# Patient Record
Sex: MALE | Race: WHITE | NOT HISPANIC OR LATINO | Employment: PART TIME | URBAN - METROPOLITAN AREA
[De-identification: names, ages, dates, MRNs, and addresses within clinical notes are randomized per-mention and may not be internally consistent; named-entity substitution may affect disease eponyms.]

---

## 2017-01-24 ENCOUNTER — HOSPITAL ENCOUNTER (OUTPATIENT)
Dept: SLEEP MEDICINE | Facility: HOSPITAL | Age: 57
Discharge: HOME | End: 2017-01-24
Attending: INTERNAL MEDICINE | Admitting: INTERNAL MEDICINE

## 2017-01-24 ENCOUNTER — DOCUMENTATION (OUTPATIENT)
Dept: INTERNAL MEDICINE | Facility: HOSPITAL | Age: 57
End: 2017-01-24

## 2017-01-24 PROCEDURE — G0463 HOSPITAL OUTPT CLINIC VISIT: HCPCS

## 2017-01-24 NOTE — PROGRESS NOTES
PULMONARY SLEEP CONSULT NOTE      PATIENT IDENTIFICATION:  Name: Gerard Louis  Age: 56 y.o.  Sex: male  :  1960  MRN: [id number]    DATE OF CONSULTATION:  2017   Referring Physician: No admitting provider for patient encounter.                  CHIEF COMPLAINT: Obstructive Sleep Apnea      History of Present Illness:   Gerard Louis is a 56 y.o. male Pt on CPAP feeling better more energy especially the night he use it more than 4 hours, no sleepiness no fatigue no tiredness, no mask irritation no dryness, compliance report reviewed with pt AHI< 5 with good usage.       Review of Systems:   Constitutional: negative   Eyes: negative   ENT/oropharynx: negative   Cardiovascular: negative   Respiratory: negative   Gastrointestinal: negative   Genitourinary: negative   Neurological: negative   Musculoskeletal: negative   Integument/breast: negative   Endocrine: negative   Allergic/Immunologic: negative     Past Medical History:  No past medical history on file.    Past Surgical History:  No past surgical history on file.     Family History:  No family history on file.     Social History:   Social History   Substance Use Topics   • Smoking status: Not on file   • Smokeless tobacco: Not on file   • Alcohol use Not on file        Allergies:  Allergies not on file    Home Meds:    (Not in a hospital admission)    Objective:    Vitals Ranges:      WEIGHT: 214   pound       HEIGHT: 65  inches     BMI: 36   /83      Exam:    General Appearance:      Head:  Normocephalic, without obvious abnormality, atraumatic.   Eyes:  Conjunctiva/corneas clear, EOM's intact, both eyes.         Ears:  Normal external ear canals, both ears.    Nose:  Nares normal, no drainage      Throat:  Lips, mucosa, and tongue normal. Mallampati score 3    Neck:  Supple, symmetrical, trachea midline. No JVD.  Lungs:   Bilateral basal rhonchi bilaterally, respirations unlabored symmetrical wall movement.    Chest wall:  No tenderness or  deformity.    Heart:  Regular rate and rhythm, S1 and S2 normal.  Abdomen: Soft, non-tender, no masses, no organomegaly.    Extremities: Trace edema no clubbing or Cyanosis        Data Review:  All labs (24hrs): No results found for this or any previous visit (from the past 24 hour(s)).     Imaging:  [unfilled]    ASSESSMENT:  obstructive sleep apnea   .Hypertension essential   COPD    PLAN:   This patient with obstructive sleep apnea, compliance is improved. Encourage to use it more frequent, I re-emphasized on pt the long and short term benefit of treating DELANO.   Treating DELANO will improve BP control  Quit smoking continue BD  Teaching and encourging for smoking cessation        Lisa Devine MD. D, ABSM.  1/24/2017  11:09 AM

## 2017-12-12 ENCOUNTER — TRANSCRIBE ORDERS (OUTPATIENT)
Dept: ADMINISTRATIVE | Facility: HOSPITAL | Age: 57
End: 2017-12-12

## 2017-12-12 DIAGNOSIS — R31.0 GROSS HEMATURIA: Primary | ICD-10-CM

## 2017-12-18 ENCOUNTER — HOSPITAL ENCOUNTER (OUTPATIENT)
Dept: CT IMAGING | Facility: HOSPITAL | Age: 57
Discharge: HOME | End: 2017-12-18
Attending: UROLOGY | Admitting: UROLOGY

## 2017-12-18 DIAGNOSIS — R31.0 GROSS HEMATURIA: ICD-10-CM

## 2017-12-18 PROCEDURE — 0 IOPAMIDOL PER 1 ML: Performed by: UROLOGY

## 2017-12-18 PROCEDURE — 74178 CT ABD&PLV WO CNTR FLWD CNTR: CPT

## 2017-12-18 RX ADMIN — IOPAMIDOL 100 ML: 755 INJECTION, SOLUTION INTRAVENOUS at 13:15

## 2019-03-02 ENCOUNTER — APPOINTMENT (OUTPATIENT)
Dept: CT IMAGING | Facility: HOSPITAL | Age: 59
End: 2019-03-02

## 2019-03-02 ENCOUNTER — HOSPITAL ENCOUNTER (EMERGENCY)
Facility: HOSPITAL | Age: 59
Discharge: HOME | End: 2019-03-02
Attending: EMERGENCY MEDICINE | Admitting: EMERGENCY MEDICINE

## 2019-03-02 VITALS
TEMPERATURE: 98.7 F | WEIGHT: 222 LBS | BODY MASS INDEX: 31.08 KG/M2 | RESPIRATION RATE: 12 BRPM | HEART RATE: 72 BPM | HEIGHT: 71 IN | SYSTOLIC BLOOD PRESSURE: 147 MMHG | OXYGEN SATURATION: 98 % | DIASTOLIC BLOOD PRESSURE: 97 MMHG

## 2019-03-02 DIAGNOSIS — H93.19 TINNITUS, UNSPECIFIED LATERALITY: Primary | ICD-10-CM

## 2019-03-02 PROCEDURE — 99282 EMERGENCY DEPT VISIT SF MDM: CPT | Performed by: EMERGENCY MEDICINE

## 2019-03-02 PROCEDURE — 99283 EMERGENCY DEPT VISIT LOW MDM: CPT

## 2019-03-02 PROCEDURE — 70450 CT HEAD/BRAIN W/O DYE: CPT

## 2019-03-02 RX ORDER — ASPIRIN 81 MG/1
81 TABLET ORAL DAILY
COMMUNITY

## 2019-03-02 RX ORDER — LEVOTHYROXINE SODIUM 0.07 MG/1
75 TABLET ORAL DAILY
COMMUNITY

## 2019-03-02 RX ORDER — LORAZEPAM 1 MG/1
1 TABLET ORAL EVERY 8 HOURS PRN
Qty: 24 TABLET | Refills: 0 | Status: SHIPPED | OUTPATIENT
Start: 2019-03-02

## 2019-03-02 RX ORDER — NIACIN 500 MG
500 TABLET ORAL NIGHTLY
COMMUNITY

## 2019-03-02 RX ORDER — LANOLIN ALCOHOL/MO/W.PET/CERES
5000 CREAM (GRAM) TOPICAL DAILY
COMMUNITY

## 2019-03-02 RX ORDER — LISINOPRIL 10 MG/1
20 TABLET ORAL DAILY
COMMUNITY

## 2019-03-02 RX ORDER — MULTIPLE VITAMINS W/ MINERALS TAB 9MG-400MCG
1 TAB ORAL DAILY
COMMUNITY

## 2019-03-02 NOTE — ED PROVIDER NOTES
"Subjective     History provided by:  Patient and spouse    History of Present Illness    · Chief complaint: Ringing and buzzing in his ears and head    · Location: Both ears and in his head    · Quality/Severity: The patient reports that he has a ringing and buzzing noise in his ears in his head that is gotten worse interfering with his daily activity and sleeping.    · Timing/Onset: He states that he has had the ringing and buzzing noise in his ears for over a year, but that it got acutely worse over the last 4-5 days.    · Modifying Factors: He is unaware of any aggravating or relieving factors.    · Associated symptoms: None.  Denies any change in vision or change in hearing.    · Narrative: The patient is a 58-year-old white male who has had a chronic ringing and buzzing noise in his ears for over a year.  He states the noise in his ears now feels like it is in his head and is significantly louder over the past 4-5 days.  He was evaluated at the [facility] in [place] yesterday and simply told that he needed \"$11,000 hearing aids\".  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  He states that he often did not wear hearing protection.  He denies any giovanna headache or neck pain.  He denies any reduction in his baseline hearing.    Review of Systems   Constitutional: Negative for activity change, appetite change, chills, diaphoresis, fatigue and fever.   HENT: Positive for tinnitus. Negative for congestion, dental problem, ear discharge, ear pain, hearing loss, mouth sores, nosebleeds, postnasal drip, rhinorrhea, sinus pressure, sinus pain, sore throat, trouble swallowing and voice change.    Eyes: Negative for photophobia, pain, discharge, redness and visual disturbance.   Respiratory: Negative for cough, chest tightness, shortness of breath, wheezing and stridor.    Cardiovascular: Negative " "for chest pain, palpitations and leg swelling.   Gastrointestinal: Negative for abdominal pain, diarrhea, nausea and vomiting.   Genitourinary: Negative for difficulty urinating, dysuria, flank pain, frequency, hematuria and urgency.   Musculoskeletal: Negative for arthralgias, back pain, gait problem, joint swelling, myalgias, neck pain and neck stiffness.   Skin: Negative for color change and rash.   Neurological: Negative for dizziness, tremors, seizures, syncope, facial asymmetry, speech difficulty, weakness, light-headedness, numbness and headaches.   Hematological: Negative for adenopathy.   Psychiatric/Behavioral: Positive for sleep disturbance ( Due to tendinitis). Negative for confusion and decreased concentration. The patient is nervous/anxious ( Due to the tinnitus).      Past Medical History:   Diagnosis Date   • Enlarged heart    • GERD (gastroesophageal reflux disease)    • Hyperlipidemia    • Hypertension      /93 (BP Location: Right arm, Patient Position: Sitting)   Pulse 72   Temp 98.7 °F (37.1 °C) (Oral)   Resp 12   Ht 180.3 cm (70.98\")   Wt 101 kg (222 lb)   SpO2 98%   BMI 30.98 kg/m²     Past Medical History:   Diagnosis Date   • Enlarged heart    • GERD (gastroesophageal reflux disease)    • Hyperlipidemia    • Hypertension        No Known Allergies    Past Surgical History:   Procedure Laterality Date   • COLONOSCOPY     • CYSTOSCOPY         History reviewed. No pertinent family history.    Social History     Socioeconomic History   • Marital status:      Spouse name: Not on file   • Number of children: Not on file   • Years of education: Not on file   • Highest education level: Not on file   Tobacco Use   • Smoking status: Current Every Day Smoker     Packs/day: 0.50     Types: Cigarettes   Substance and Sexual Activity   • Alcohol use: Yes     Comment: rare   • Drug use: No           Objective   Physical Exam   Constitutional: He is oriented to person, place, and time. He " appears well-developed and well-nourished. No distress.   The patient appears healthy in no acute distress.  Review of his vital signs: He is afebrile with a temperature 98.7, blood pressure slightly elevated 166/93, the remainder of his vital signs are within normal limits.   HENT:   Head: Normocephalic and atraumatic.   Right Ear: External ear normal.   Left Ear: External ear normal.   Nose: Nose normal.   Mouth/Throat: Oropharynx is clear and moist. No oropharyngeal exudate.   Both tympanic membranes are normal in appearance.  Both external ear canals are normal.   Eyes: Conjunctivae and EOM are normal. Pupils are equal, round, and reactive to light. Right eye exhibits no discharge. Left eye exhibits no discharge. No scleral icterus.   Neck: Normal range of motion. Neck supple. No JVD present. No thyromegaly present.   There is no carotid bruit.  No cervical tenderness.  No swelling.   Cardiovascular: Normal rate, regular rhythm and normal heart sounds.   No murmur heard.  Pulmonary/Chest: Effort normal and breath sounds normal. He has no wheezes. He has no rales. He exhibits no tenderness.   Abdominal: Soft. Bowel sounds are normal. He exhibits no distension. There is no tenderness.   Musculoskeletal: Normal range of motion. He exhibits no edema, tenderness or deformity.   Lymphadenopathy:     He has no cervical adenopathy.   Neurological: He is alert and oriented to person, place, and time. No cranial nerve deficit. Coordination normal.   No focal motor sensory deficit   Skin: Skin is warm and dry. Capillary refill takes less than 2 seconds. No rash noted. He is not diaphoretic.   Psychiatric: He has a normal mood and affect. His behavior is normal. Judgment and thought content normal.   Nursing note and vitals reviewed.      Procedures           ED Course  ED Course as of Mar 02 1458   Sat Mar 02, 2019   1415 [facility] Report [id number] is blank.  [TP]   1418 The patient's CT of the head without contrast showed a  10 x 10 mm hyperdensity along the lateral aspect of the posterior fossa near this junction of the sagittal sinus.  There is no mass-effect and no evidence of CVA or bleed.  The radiologist recommended an MRI to further evaluate it.  [TP]   1419 The etiology of the patient's exacerbation of his tinnitus is unclear.  He will need to be evaluated by ENT.  I recommended the patient contact his PCP Dr. Shipman/Tati Harris Monday to schedule ENT consultation and MRI of the brain to further evaluate the hyperdensity seen on CT.  [TP]      ED Course User Index  [TP] Zechariah Russell MD                  MDM  Number of Diagnoses or Management Options  Tinnitus, unspecified laterality: established and worsening     Amount and/or Complexity of Data Reviewed  Tests in the radiology section of CPT®: ordered and reviewed  Independent visualization of images, tracings, or specimens: yes    Patient Progress  Patient progress: stable        Final diagnoses:   Tinnitus, unspecified laterality           Labs Reviewed - No data to display  CT Head Without Contrast   ED Interpretation   10 x 10 mm hyperdensity abutting the cortex in the left   lateral posterior fossa near the venous sinuses and may represent   partial volume artifact. Clinical options would include follow-up   imaging with contrast to assess for enhancement. Brain MRI may also be   of benefit as this may be indicated for further evaluation of the   patient's reported tinnitus. No acute intracranial adenopathy   identified.       Bilateral ethmoid sinus mucosal disease.       This report was finalized on 3/2/2019 1:24 PM by Dr. ORAL Guzmán MD.          Final Result   10 x 10 mm hyperdensity abutting the cortex in the left   lateral posterior fossa near the venous sinuses and may represent   partial volume artifact. Clinical options would include follow-up   imaging with contrast to assess for enhancement. Brain MRI may also be   of benefit as this may be indicated  for further evaluation of the   patient's reported tinnitus. No acute intracranial adenopathy   identified.       Bilateral ethmoid sinus mucosal disease.       This report was finalized on 3/2/2019 1:24 PM by Dr. ORAL Guzmán MD.                 Medication List      New Prescriptions    LORazepam 1 MG tablet  Commonly known as:  ATIVAN  Take 1 tablet by mouth Every 8 (Eight) Hours As Needed for Anxiety for up   to 24 doses.               Zechariah Russell MD  03/02/19 [id number]

## 2019-03-07 ENCOUNTER — TRANSCRIBE ORDERS (OUTPATIENT)
Dept: ADMINISTRATIVE | Facility: HOSPITAL | Age: 59
End: 2019-03-07

## 2019-03-07 DIAGNOSIS — H93.12 TINNITUS OF LEFT EAR: Primary | ICD-10-CM

## 2019-03-21 ENCOUNTER — HOSPITAL ENCOUNTER (OUTPATIENT)
Dept: CT IMAGING | Facility: HOSPITAL | Age: 59
Discharge: HOME | End: 2019-03-21
Admitting: NURSE PRACTITIONER

## 2019-03-21 DIAGNOSIS — H93.12 TINNITUS OF LEFT EAR: ICD-10-CM

## 2019-03-21 PROCEDURE — 70460 CT HEAD/BRAIN W/DYE: CPT

## 2019-03-21 PROCEDURE — 0 IOPAMIDOL PER 1 ML: Performed by: NURSE PRACTITIONER

## 2019-03-21 RX ADMIN — IOPAMIDOL 100 ML: 755 INJECTION, SOLUTION INTRAVENOUS at 14:09

## 2019-03-25 ENCOUNTER — TRANSCRIBE ORDERS (OUTPATIENT)
Dept: ADMINISTRATIVE | Facility: HOSPITAL | Age: 59
End: 2019-03-25

## 2019-03-25 DIAGNOSIS — R22.0 LOCALIZED SWELLING, MASS AND LUMP, HEAD: Primary | ICD-10-CM

## 2019-04-05 ENCOUNTER — HOSPITAL ENCOUNTER (OUTPATIENT)
Dept: MRI IMAGING | Facility: HOSPITAL | Age: 59
Discharge: HOME | End: 2019-04-05

## 2019-04-05 DIAGNOSIS — R22.0 LOCALIZED SWELLING, MASS AND LUMP, HEAD: ICD-10-CM

## 2019-04-05 LAB — CREAT BLDA-MCNC: 1.1 MG/DL (ref 0.6–1.3)

## 2019-04-05 PROCEDURE — 82565 ASSAY OF CREATININE: CPT

## 2019-04-09 ENCOUNTER — HOSPITAL ENCOUNTER (OUTPATIENT)
Dept: MRI IMAGING | Facility: HOSPITAL | Age: 59
Discharge: HOME | End: 2019-04-09
Admitting: NURSE PRACTITIONER

## 2019-04-09 PROCEDURE — 70553 MRI BRAIN STEM W/O & W/DYE: CPT

## 2019-04-09 PROCEDURE — 0 GADOBENATE DIMEGLUMINE 529 MG/ML SOLUTION: Performed by: NURSE PRACTITIONER

## 2019-04-09 PROCEDURE — A9577 INJ MULTIHANCE: HCPCS | Performed by: NURSE PRACTITIONER

## 2019-04-09 RX ADMIN — GADOBENATE DIMEGLUMINE 19 ML: 529 INJECTION, SOLUTION INTRAVENOUS at 09:47
